# Patient Record
(demographics unavailable — no encounter records)

---

## 2024-10-25 NOTE — HISTORY OF PRESENT ILLNESS
[FreeTextEntry1] : Very pleasant 45-year-old gentleman who presents for follow-up of bilateral ureteral obstruction.  He feels well.  He underwent stent exchange approximately 1 year ago.  H he continues to feel well.  He continues to undergo dialysis.  He presents today to schedule repeat stent exchange.

## 2024-10-25 NOTE — ASSESSMENT
[FreeTextEntry1] : Very pleasant 45-year-old gentleman who presents for follow-up of bilateral ureteral obstruction, CKD -Patient reports that he continues to make urine -Urine culture -We discussed risks and benefits of bilateral ureteral stent exchange at this time.  We will schedule stent exchange for the near future  Patient is being seen today for evaluation and management of a chronic and longitudinal ongoing condition and I am of the primary treating physician

## 2024-11-13 NOTE — ASSESSMENT
[FreeTextEntry1] : #Insomnia -Discussed with patient the risks of being on Trazadone long term -Patient is agreeable with only being prescribed 15 pills per month  -Will send refill to [facility] for 15 pills.  -Patient is follow up in clinic on 11/26/24

## 2024-11-13 NOTE — HISTORY OF PRESENT ILLNESS
[Medical Office: ([facility])___] : at the medical office located in  [Other Location: e.g. School (Enter Location, City,State)___] : at [unfilled], at the time of the visit. 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 Has been on Trazadone since 2018. He was previously on Seroquel but self DC'd because was getting heart palpitations. Patient initially tried melatonin but had no relief and has tried other medications. Has more trouble staying asleep rather than initiating sleep.  Discussed with patient the risks of long-term use of Trazadone including dependence and tolerance and patient is agreeable to only taking 15 pills per month as opposed to 30 pills.

## 2024-11-13 NOTE — END OF VISIT
[] : Resident [FreeTextEntry3] : chronic nightly use of trazadone for insomnia. as above, lengthy discussion re risk of developin gtolerance from longterm use. suggest  abstaining 1-2 nigths per week. consider consultation with psych/behavioral health re pharmocology options

## 2024-12-04 NOTE — RESULTS/DATA
[de-identified] : -Reviewed [facility] LAB 12/3/24 : Your anemia improved. Your Hemoglobin was 11.4 comparing to Hgb 9.9 on 5/23/24.Your kidney function test got a little worse as BUN/Cr=55/11.3 comparing to BUN/cr=43/9.81 on 5/23/24. Your eGFR was stable as 5, comparing to eGFR 6 on 5/23/24. Please tell your nephrologist for this lab result. There is no issued for medical clearance.  -Reviewed ECG 11/26/2024 : results reviewed, NSR, HR 69bpm, nl axis/intervals, no acute ST/T changes, no LVH.

## 2024-12-04 NOTE — RESULTS/DATA
[de-identified] : -Reviewed [facility] LAB 12/3/24 : Your anemia improved. Your Hemoglobin was 11.4 comparing to Hgb 9.9 on 5/23/24.Your kidney function test got a little worse as BUN/Cr=55/11.3 comparing to BUN/cr=43/9.81 on 5/23/24. Your eGFR was stable as 5, comparing to eGFR 6 on 5/23/24. Please tell your nephrologist for this lab result. There is no issued for medical clearance.  -Reviewed ECG 11/26/2024 : results reviewed, NSR, HR 69bpm, nl axis/intervals, no acute ST/T changes, no LVH.

## 2024-12-04 NOTE — ASSESSMENT
[High Risk Surgery - Intraperitoneal, Intrathoracic or Supringuinal Vascular Procedures] : High Risk Surgery - Intraperitoneal, Intrathoracic or Supringuinal Vascular Procedures - No (0) [Ischemic Heart Disease] : Ischemic Heart Disease - No (0) [Congestive Heart Failure] : Congestive Heart Failure - No (0) [Prior Cerebrovascular Accident or TIA] : Prior Cerebrovascular Accident or TIA - No (0) [Insulin-dependent Diabetic (1 Point)] : Insulin-dependent Diabetic - No (0) [0] : 0 , RCRI Class: I, Risk of Post-Op Cardiac Complications: 3.9%, 95% CI for Risk Estimate: 2.8% - 5.4% [Patient Optimized for Surgery] : Patient optimized for surgery [No Further Testing Recommended] : no further testing recommended [Continue medications as is] : Continue current medications [As per surgery] : as per surgery [Creatinine >= 2mg/dL (1 Point)] : Creatinine >= 2mg/dL - Yes (1) [1] : 1 , RCRI Class: II, Risk of Post-Op Cardiac Complications: 6.0%, 95% CI for Risk Estimate: 4.9% - 7.4% [FreeTextEntry4] : Mr. 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  Pt is moderate risk candidate for low risk procedure with no further w/up required prior to planned surgery and no contraindication to proceeding with planned surgery.   [FreeTextEntry7] : Do not take Aspirin, NSAID( Motrin, Ibuprofen etc.), Herb, supplement 7 days prior to surgery. Take _Metoprolol__ with small sips of water in the morning of surgery.

## 2024-12-04 NOTE — HISTORY OF PRESENT ILLNESS
[No Pertinent Cardiac History] : no history of aortic stenosis, atrial fibrillation, coronary artery disease, recent myocardial infarction, or implantable device/pacemaker [No Pertinent Pulmonary History] : no history of asthma, COPD, sleep apnea, or smoking [No Adverse Anesthesia Reaction] : no adverse anesthesia reaction in self or family member [(Patient denies any chest pain, claudication, dyspnea on exertion, orthopnea, palpitations or syncope)] : Patient denies any chest pain, claudication, dyspnea on exertion, orthopnea, palpitations or syncope [Moderate (4-6 METs)] : Moderate (4-6 METs) [Asthma] : asthma [Chronic Kidney Disease] : chronic kidney disease [COPD] : no COPD [Sleep Apnea] : no sleep apnea [Smoker] : not a smoker [Chronic Anticoagulation] : no chronic anticoagulation [Diabetes] : no diabetes [FreeTextEntry1] : b/l ureteral stents replacement [FreeTextEntry2] : 12/12/24 [FreeTextEntry3] : Dr. Pineda [FreeTextEntry4] : Mr. 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  Last seen by our clinic 5/23/24.  He is getting HD for ESRD (M/W/F) using left UE AV fistula since 2018.  He tells me that he is still creating urine, but his bladder has shrunk and does not expand enough to hold urine.  Has had urethral stents since 2018 and q 6 months then q yearly changing it.  His asthma has been quiet and last use of albuterol inhaler was 2 years ago.  He got HD this morning due to Thanksgiving holiday schedule by HD Center.  He will visit [facility] tomorrow morning and wishes have copy of the clearance, ECG and lab results via email. Denied fever, chills,CP, SOB, abdominal pain, n/v/c/d.

## 2024-12-04 NOTE — REVIEW OF SYSTEMS
Pt arrived to 104 for cerebral angiogram +/- aneurysm embolization . Pt oriented to unit and staff, Pt safely transferred from stretcher to procedural table. Fall risk reviewed and comfort measures utilized with interventions. Safety strap applied, position pillows to minimize pressure points. Blankets applied. Pt prepped and draped utilizing standard sterile technique. Patient placed on continuous monitoring, as required by sedation policy. Timeouts implemented utilizing standard universal time-out per department and facility policy. CRNA to remain at bedside with continuous monitoring. Pt resting comfortably. Denies pain/discomfort. Will continue to monitor. See flow sheets for monitoring, medication administration, and updates. patient verbalizes understanding.           [FreeTextEntry2] : Constitutional:  no fever and no chills.  Eyes:  no discharge.  HEENT:  no earache.  Cardiovascular:  no chest pain, no palpitations and no lower extremity edema.  Respiratory:  no shortness of breath, no wheezing and no cough.  Gastrointestinal:  no abdominal pain, no nausea and no vomiting.  Genitourinary:  no dysuria.  Musculoskeletal:  no joint pain.  Integumentary:  no itching.  Neurological:  no headache.  Psychiatric:  not suicidal.  Hematologic/Lymphatic:  no easy bleeding. [FreeTextEntry8] : see hpi

## 2024-12-04 NOTE — PHYSICAL EXAM
[No Edema] : there was no peripheral edema [de-identified] : WDWN in NAD HEENT:  unremarkable Neck:  supple, no JVD, no LN Lungs:  CTA B/L, no W/R/R Heart:  Reg rate, +S1S2, no M/R/G Abdomen:  soft, NT, ND, +BS, no masses, no HS-megaly Genital: No pubic or genital lesions noted. Ext:  no C/C/E Neuro:  no focal deficits [de-identified] : left upper arm AVF for HD, good thrill and bruit+

## 2024-12-04 NOTE — ASSESSMENT
[High Risk Surgery - Intraperitoneal, Intrathoracic or Supringuinal Vascular Procedures] : High Risk Surgery - Intraperitoneal, Intrathoracic or Supringuinal Vascular Procedures - No (0) [Ischemic Heart Disease] : Ischemic Heart Disease - No (0) [Congestive Heart Failure] : Congestive Heart Failure - No (0) [Prior Cerebrovascular Accident or TIA] : Prior Cerebrovascular Accident or TIA - No (0) [Insulin-dependent Diabetic (1 Point)] : Insulin-dependent Diabetic - No (0) [0] : 0 , RCRI Class: I, Risk of Post-Op Cardiac Complications: 3.9%, 95% CI for Risk Estimate: 2.8% - 5.4% [Patient Optimized for Surgery] : Patient optimized for surgery [No Further Testing Recommended] : no further testing recommended [Continue medications as is] : Continue current medications [As per surgery] : as per surgery [Creatinine >= 2mg/dL (1 Point)] : Creatinine >= 2mg/dL - Yes (1) [1] : 1 , RCRI Class: II, Risk of Post-Op Cardiac Complications: 6.0%, 95% CI for Risk Estimate: 4.9% - 7.4% [FreeTextEntry4] : Mr. 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  Pt is moderate risk candidate for low risk procedure with no further w/up required prior to planned surgery and no contraindication to proceeding with planned surgery.   [FreeTextEntry7] : Do not take Aspirin, NSAID( Motrin, Ibuprofen etc.), Herb, supplement 7 days prior to surgery. Take _Metoprolol__ with small sips of water in the morning of surgery.

## 2024-12-04 NOTE — HISTORY OF PRESENT ILLNESS
[No Pertinent Cardiac History] : no history of aortic stenosis, atrial fibrillation, coronary artery disease, recent myocardial infarction, or implantable device/pacemaker [No Pertinent Pulmonary History] : no history of asthma, COPD, sleep apnea, or smoking [No Adverse Anesthesia Reaction] : no adverse anesthesia reaction in self or family member [(Patient denies any chest pain, claudication, dyspnea on exertion, orthopnea, palpitations or syncope)] : Patient denies any chest pain, claudication, dyspnea on exertion, orthopnea, palpitations or syncope [Moderate (4-6 METs)] : Moderate (4-6 METs) [Asthma] : asthma [Chronic Kidney Disease] : chronic kidney disease [COPD] : no COPD [Sleep Apnea] : no sleep apnea [Smoker] : not a smoker [Chronic Anticoagulation] : no chronic anticoagulation [Diabetes] : no diabetes [FreeTextEntry1] : b/l ureteral stents replacement [FreeTextEntry2] : 12/12/24 [FreeTextEntry3] : Dr. Pineda [FreeTextEntry4] : Mr. 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  Last seen by our clinic 5/23/24.  He is getting HD for ESRD (M/W/F) using left UE AV fistula since 2018.  He tells me that he is still creating urine, but his bladder has shrunk and does not expand enough to hold urine.  Has had urethral stents since 2018 and q 6 months then q yearly changing it.  His asthma has been quiet and last use of albuterol inhaler was 2 years ago.  He got HD this morning due to Thanksgiving holiday schedule by HD Center.  He will visit [facility] tomorrow morning and wishes have copy of the clearance, ECG and lab results via email. Denied fever, chills,CP, SOB, abdominal pain, n/v/c/d.

## 2024-12-04 NOTE — ASSESSMENT
[High Risk Surgery - Intraperitoneal, Intrathoracic or Supringuinal Vascular Procedures] : High Risk Surgery - Intraperitoneal, Intrathoracic or Supringuinal Vascular Procedures - No (0) [Ischemic Heart Disease] : Ischemic Heart Disease - No (0) [Congestive Heart Failure] : Congestive Heart Failure - No (0) [Prior Cerebrovascular Accident or TIA] : Prior Cerebrovascular Accident or TIA - No (0) [Insulin-dependent Diabetic (1 Point)] : Insulin-dependent Diabetic - No (0) [0] : 0 , RCRI Class: I, Risk of Post-Op Cardiac Complications: 3.9%, 95% CI for Risk Estimate: 2.8% - 5.4% [Patient Optimized for Surgery] : Patient optimized for surgery [No Further Testing Recommended] : no further testing recommended [Continue medications as is] : Continue current medications [As per surgery] : as per surgery [Creatinine >= 2mg/dL (1 Point)] : Creatinine >= 2mg/dL - Yes (1) [1] : 1 , RCRI Class: II, Risk of Post-Op Cardiac Complications: 6.0%, 95% CI for Risk Estimate: 4.9% - 7.4% [FreeTextEntry4] : Mr. 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  Pt is moderate risk candidate for low risk procedure with no further w/up required prior to planned surgery and no contraindication to proceeding with planned surgery.   [FreeTextEntry7] : Do not take Aspirin, NSAID( Motrin, Ibuprofen etc.), Herb, supplement 7 days prior to surgery. Take _Metoprolol__ with small sips of water in the morning of surgery.

## 2024-12-04 NOTE — PHYSICAL EXAM
[No Edema] : there was no peripheral edema [de-identified] : WDWN in NAD HEENT:  unremarkable Neck:  supple, no JVD, no LN Lungs:  CTA B/L, no W/R/R Heart:  Reg rate, +S1S2, no M/R/G Abdomen:  soft, NT, ND, +BS, no masses, no HS-megaly Genital: No pubic or genital lesions noted. Ext:  no C/C/E Neuro:  no focal deficits [de-identified] : left upper arm AVF for HD, good thrill and bruit+

## 2024-12-04 NOTE — PHYSICAL EXAM
[No Edema] : there was no peripheral edema [de-identified] : WDWN in NAD HEENT:  unremarkable Neck:  supple, no JVD, no LN Lungs:  CTA B/L, no W/R/R Heart:  Reg rate, +S1S2, no M/R/G Abdomen:  soft, NT, ND, +BS, no masses, no HS-megaly Genital: No pubic or genital lesions noted. Ext:  no C/C/E Neuro:  no focal deficits [de-identified] : left upper arm AVF for HD, good thrill and bruit+

## 2024-12-04 NOTE — HISTORY OF PRESENT ILLNESS
[No Pertinent Cardiac History] : no history of aortic stenosis, atrial fibrillation, coronary artery disease, recent myocardial infarction, or implantable device/pacemaker [No Pertinent Pulmonary History] : no history of asthma, COPD, sleep apnea, or smoking [No Adverse Anesthesia Reaction] : no adverse anesthesia reaction in self or family member [(Patient denies any chest pain, claudication, dyspnea on exertion, orthopnea, palpitations or syncope)] : Patient denies any chest pain, claudication, dyspnea on exertion, orthopnea, palpitations or syncope [Moderate (4-6 METs)] : Moderate (4-6 METs) [Asthma] : asthma [Chronic Kidney Disease] : chronic kidney disease [COPD] : no COPD [Sleep Apnea] : no sleep apnea [Smoker] : not a smoker [Chronic Anticoagulation] : no chronic anticoagulation [Diabetes] : no diabetes [FreeTextEntry1] : b/l ureteral stents replacement [FreeTextEntry2] : 12/12/24 [FreeTextEntry3] : Dr. Pineda [FreeTextEntry4] : Mr. 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  Last seen by our clinic 5/23/24.  He is getting HD for ESRD (M/W/F) using left UE AV fistula since 2018.  He tells me that he is still creating urine, but his bladder has shrunk and does not expand enough to hold urine.  Has had urethral stents since 2018 and q 6 months then q yearly changing it.  His asthma has been quiet and last use of albuterol inhaler was 2 years ago.  He got HD this morning due to Thanksgiving holiday schedule by HD Center.  He will visit [facility] tomorrow morning and wishes have copy of the clearance, ECG and lab results via email. Denied fever, chills,CP, SOB, abdominal pain, n/v/c/d.

## 2024-12-04 NOTE — RESULTS/DATA
[de-identified] : -Reviewed [facility] LAB 12/3/24 : Your anemia improved. Your Hemoglobin was 11.4 comparing to Hgb 9.9 on 5/23/24.Your kidney function test got a little worse as BUN/Cr=55/11.3 comparing to BUN/cr=43/9.81 on 5/23/24. Your eGFR was stable as 5, comparing to eGFR 6 on 5/23/24. Please tell your nephrologist for this lab result. There is no issued for medical clearance.  -Reviewed ECG 11/26/2024 : results reviewed, NSR, HR 69bpm, nl axis/intervals, no acute ST/T changes, no LVH.

## 2025-01-07 NOTE — ASSESSMENT
[FreeTextEntry1] : Very pleasant 45 year old gentleman who presents for follow up of bilateral ureteral obstruction -Stent change uncomplicated -F/U in 10 months for planning for repeat stent change  Patient is being seen today for evaluation and management of a chronic and longitudinal ongoing condition and I am the primary treating physician

## 2025-01-07 NOTE — HISTORY OF PRESENT ILLNESS
[FreeTextEntry1] : Very pleasant 45-year-old gentleman who presents for follow-up of bilateral ureteral obstruction.  He feels well.  He underwent stent exchange approximately 1 month ago.  H he continues to feel well.  He continues to undergo dialysis.  No problems after surgery.

## 2025-05-16 NOTE — PHYSICAL EXAM
[Normal Breath Sounds] : Normal breath sounds [Normal Heart Sounds] : normal heart sounds [de-identified] : NAD [de-identified] : WNL [FreeTextEntry1] : LUE: warm, well perfused. Palpable brachial and radial pulse. Thrill over fistula. Sensorimotor function intact. No skin discoloration or wounds. 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

## 2025-05-16 NOTE — ASSESSMENT
[FreeTextEntry1] : In summary, Mr. Murrieta presents s/p LUE BC AVF for HD. The fistula is working properly and there is no indication for intervention at this time. Duplex showed stable size of two aneurysms with good volume flow and no flow limiting lesions.  I have instructed him to have his HD nurses/technicians avoid the aneurysmal segment of the fistula. He will otherwise follow up in July.

## 2025-07-01 NOTE — HISTORY OF PRESENT ILLNESS
[FreeTextEntry1] : Mr. Murrieta presents for follow up s/p left brachiocephalic AVF creation on 6/19/18. He subsequently underwent balloon assisted maturation in June of 2019. During the COVID19 pandemic, he developed difficulty with access and on 8/21/2020, he underwent LUE AVF pharmacomechanical thrombectomy and balloon angioplasty of proximal venous stenosis.  Since his last visit, he has been doing well, however he has had difficulty with dialysis during the last week and presents for evaluation.  He denies any LUE paresthesia, weakness, pain, or skin discoloration. He underwent fistulogram with balloon angioplasty on 2/15/23. Fistula is being used Mon/Wed/Fri without issues. He has developed slight aneurysmal dilatation of the outflow vein. He reports that this is from repeat use of that portion of the vein and he has instructed his HD center to use more distal vein.   He is planning a trip to [place] November 24, 2023 and wanted to assess the fistula prior to travel. He denies any difficulty with the fistula at HD.  Medications: Cinacalcet, Ca2+, vitamins, Metoprolol, Tadalafil, Trazodone, Ventolin,  [de-identified] : 11/13/23: Mr. Murrieta presents for fistula evaluation prior to travel to [place] (11/24/23-11/30/23). He denies any issues with fistula use at HD. 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   3/25/24: Mr. Murrieta presents for fistula evaluation. He denies any issues with fistula use at HD. He believes that the two small aneurysms (roughly 2cm in diameter) have not increased in size. He denies any wounds/ulcers. His father recently passed away from respiratory/COPD complications.   10/4/24: Mr. Murrieta presents for fistula evaluation. He denies any issues with fistula use at HD. He has two small aneurysms (roughly 2cm in diameter) that have not increased in size. He denies any wounds/ulcers. He is otherwise without complaints.   4/4/25: Mr. Murrieta presents for surveillance of LUE fistula. It is being used at HD without issues. He believes that the aneurysms are stable in size. He denies any wounds. He is without complaints.   5/16/25: Mr. Murrieta presents for surveillance duplex of LUE fistula. It is being used at HD without issues. He believes that the aneurysms are stable in size. He denies any wounds. He is without complaints.   7/1/25:  Mr. Murrieta presents for surveillance duplex of LUE fistula prior to me leaving the practice. It is being used at HD without issues. He believes that the aneurysms are stable in size. He denies any wounds. He is without complaints.

## 2025-07-01 NOTE — ASSESSMENT
[FreeTextEntry1] : In summary, Mr. Murrieta presents s/p LUE BC AVF for HD. The fistula is working properly and there is no indication for intervention at this time. Duplex showed stable size of two aneurysms (stable) with good volume flow and no flow limiting lesions.  I have instructed him to have his HD nurses/technicians avoid the aneurysmal segment of the fistula. He will follow up with Dr. Garduno in six months.

## 2025-07-01 NOTE — PHYSICAL EXAM
[Normal Breath Sounds] : Normal breath sounds [Normal Heart Sounds] : normal heart sounds [de-identified] : NAD [de-identified] : WNL [FreeTextEntry1] : LUE: warm, well perfused. Palpable brachial and radial pulse. Thrill over fistula. Sensorimotor function intact. No skin discoloration or wounds. 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